# Patient Record
Sex: FEMALE
[De-identification: names, ages, dates, MRNs, and addresses within clinical notes are randomized per-mention and may not be internally consistent; named-entity substitution may affect disease eponyms.]

---

## 2022-09-26 ENCOUNTER — DASHBOARD ENCOUNTERS (OUTPATIENT)
Age: 62
End: 2022-09-26

## 2022-09-29 ENCOUNTER — OFFICE VISIT (OUTPATIENT)
Dept: URBAN - METROPOLITAN AREA CLINIC 74 | Facility: CLINIC | Age: 62
End: 2022-09-29

## 2022-09-29 NOTE — HPI-TODAY'S VISIT:
The patient is 62-year-old female with known history of diabetes, hypertension, hyperlipidemia, chronic pain syndrome, hypothyroidism, anxiety, depression, degenerative disc disease, status post cervical fusion, status post lumbar surgery, remote history of hiatal hernia repair in 1997, cholecystectomy in 1996, GERD, and osteoarthritis is presented to our clinic today to schedule EGD and colonoscopy.